# Patient Record
Sex: FEMALE | Race: WHITE | NOT HISPANIC OR LATINO | Employment: FULL TIME | URBAN - METROPOLITAN AREA
[De-identification: names, ages, dates, MRNs, and addresses within clinical notes are randomized per-mention and may not be internally consistent; named-entity substitution may affect disease eponyms.]

---

## 2021-09-14 ENCOUNTER — OFFICE VISIT (OUTPATIENT)
Dept: URGENT CARE | Facility: PHYSICIAN GROUP | Age: 31
End: 2021-09-14
Payer: COMMERCIAL

## 2021-09-14 VITALS
HEIGHT: 65 IN | DIASTOLIC BLOOD PRESSURE: 58 MMHG | OXYGEN SATURATION: 99 % | BODY MASS INDEX: 29.16 KG/M2 | RESPIRATION RATE: 16 BRPM | TEMPERATURE: 99.4 F | HEART RATE: 89 BPM | SYSTOLIC BLOOD PRESSURE: 112 MMHG | WEIGHT: 175 LBS

## 2021-09-14 DIAGNOSIS — J02.9 SORE THROAT: ICD-10-CM

## 2021-09-14 LAB
INT CON NEG: NEGATIVE
INT CON POS: POSITIVE
S PYO AG THROAT QL: NEGATIVE

## 2021-09-14 PROCEDURE — 87880 STREP A ASSAY W/OPTIC: CPT | Performed by: PHYSICIAN ASSISTANT

## 2021-09-14 PROCEDURE — 99203 OFFICE O/P NEW LOW 30 MIN: CPT | Performed by: PHYSICIAN ASSISTANT

## 2021-09-14 ASSESSMENT — ENCOUNTER SYMPTOMS
COUGH: 0
FEVER: 0
SORE THROAT: 1
CHILLS: 0

## 2021-09-14 NOTE — PROGRESS NOTES
"  Subjective:   Keturah Rusty is a 30 y.o. female who presents today with   Chief Complaint   Patient presents with   • Pharyngitis     x2 days      Pharyngitis   This is a new problem. Episode onset: 2 days. The problem has been unchanged. There has been no fever. The pain is mild. Pertinent negatives include no congestion or coughing. She has tried nothing for the symptoms. The treatment provided no relief.   I personally donned proper PPE throughout visit today.     PMH:  has no past medical history on file.  MEDS: No current outpatient medications on file.  ALLERGIES:   Allergies   Allergen Reactions   • Doxycycline      SURGHX: History reviewed. No pertinent surgical history.  SOCHX:  reports that she has never smoked. She has never used smokeless tobacco.  FH: Reviewed with patient, not pertinent to this visit.     Review of Systems   Constitutional: Negative for chills and fever.   HENT: Positive for sore throat. Negative for congestion.    Respiratory: Negative for cough.       Objective:   /58   Pulse 89   Temp 37.4 °C (99.4 °F) (Temporal)   Resp 16   Ht 1.651 m (5' 5\")   Wt 79.4 kg (175 lb)   SpO2 99%   BMI 29.12 kg/m²   Physical Exam  Vitals and nursing note reviewed.   Constitutional:       General: She is not in acute distress.     Appearance: Normal appearance. She is well-developed. She is not ill-appearing or toxic-appearing.   HENT:      Head: Normocephalic and atraumatic.      Right Ear: Hearing normal.      Left Ear: Hearing normal.      Mouth/Throat:      Pharynx: Posterior oropharyngeal erythema present. No oropharyngeal exudate.   Eyes:      Pupils: Pupils are equal, round, and reactive to light.   Cardiovascular:      Rate and Rhythm: Normal rate and regular rhythm.      Heart sounds: Normal heart sounds.   Pulmonary:      Effort: Pulmonary effort is normal. No respiratory distress.      Breath sounds: Normal breath sounds. No stridor. No wheezing, rhonchi or rales.   Musculoskeletal: "      Comments: Normal movement in all 4 extremities   Lymphadenopathy:      Cervical: No cervical adenopathy.   Skin:     General: Skin is warm and dry.   Neurological:      Mental Status: She is alert.      Coordination: Coordination normal.   Psychiatric:         Mood and Affect: Mood normal.       STREP A NEG  Assessment/Plan:   Assessment    1. Sore throat  - POCT Rapid Strep A  Symptoms and presentation are most consistent with viral illness versus postnasal drip that are causing her symptoms.  Differential diagnosis, natural history, supportive care, and indications for immediate follow-up discussed.   Patient given instructions and understanding of medications and treatment.    If not improving in 3-5 days, F/U with PCP or return to UC if symptoms worsen.    Patient agreeable to plan.  Greater than 20 minutes were spent reviewing patient's chart, examining and obtaining history from patient, and discussing plan of care.       Please note that this dictation was created using voice recognition software. I have made every reasonable attempt to correct obvious errors, but I expect that there are errors of grammar and possibly content that I did not discover before finalizing the note.    Royal Bell PA-C

## 2021-09-17 ENCOUNTER — OFFICE VISIT (OUTPATIENT)
Dept: URGENT CARE | Facility: PHYSICIAN GROUP | Age: 31
End: 2021-09-17
Payer: COMMERCIAL

## 2021-09-17 VITALS
TEMPERATURE: 98.1 F | DIASTOLIC BLOOD PRESSURE: 70 MMHG | WEIGHT: 183 LBS | RESPIRATION RATE: 16 BRPM | SYSTOLIC BLOOD PRESSURE: 126 MMHG | HEART RATE: 83 BPM | HEIGHT: 65 IN | OXYGEN SATURATION: 98 % | BODY MASS INDEX: 30.49 KG/M2

## 2021-09-17 DIAGNOSIS — J02.0 PHARYNGITIS DUE TO STREPTOCOCCUS SPECIES: ICD-10-CM

## 2021-09-17 LAB
INT CON NEG: NORMAL
INT CON POS: NORMAL
S PYO AG THROAT QL: POSITIVE

## 2021-09-17 PROCEDURE — 99213 OFFICE O/P EST LOW 20 MIN: CPT | Performed by: PHYSICIAN ASSISTANT

## 2021-09-17 PROCEDURE — 87880 STREP A ASSAY W/OPTIC: CPT | Performed by: PHYSICIAN ASSISTANT

## 2021-09-17 RX ORDER — AMOXICILLIN 500 MG/1
500 CAPSULE ORAL 2 TIMES DAILY
Qty: 20 CAPSULE | Refills: 0 | Status: SHIPPED | OUTPATIENT
Start: 2021-09-17 | End: 2021-09-27

## 2021-09-17 ASSESSMENT — ENCOUNTER SYMPTOMS
PALPITATIONS: 0
STRIDOR: 0
VOMITING: 0
SHORTNESS OF BREATH: 0
DIARRHEA: 0
FEVER: 0
COUGH: 0
SORE THROAT: 1
SPUTUM PRODUCTION: 0
HEADACHES: 0
DIAPHORESIS: 0
MYALGIAS: 0
NAUSEA: 0
DIZZINESS: 0
CHILLS: 0
WHEEZING: 0
SINUS PAIN: 0
ABDOMINAL PAIN: 0

## 2021-09-17 NOTE — PROGRESS NOTES
Subjective:   Keturah Ojeda is a 30 y.o. female who presents for Pharyngitis (possible strep , white dots on tonsils,swollen tonsils)      HPI:  This is a very pleasant 30-year-old female presenting to the clinic with sore throat x7 days.  Patient was last seen in clinic 3 days ago for this issue.  At that time she tested negative for strep pharyngitis.  States her symptoms have remained fairly constant.  She has noticed exudates on both tonsils.  Does not believe she has been running a fever.  She does have pain every time she swallows.  Denies any difficulty swallowing or pain with neck movement.  Denies any cough or congestion.  She has been taking OTC antihistamine, Advil and DayQuil daily which provides mild improvement.  She has had no known ill contacts.    Review of Systems   Constitutional: Negative for chills, diaphoresis, fever and malaise/fatigue.   HENT: Positive for sore throat. Negative for congestion, ear pain and sinus pain.    Respiratory: Negative for cough, sputum production, shortness of breath, wheezing and stridor.    Cardiovascular: Negative for chest pain and palpitations.   Gastrointestinal: Negative for abdominal pain, diarrhea, nausea and vomiting.   Musculoskeletal: Negative for myalgias.   Neurological: Negative for dizziness and headaches.   Endo/Heme/Allergies: Negative for environmental allergies.       Medications:    • This patient does not have an active medication from one of the medication groupers.    Allergies: Doxycycline    Problem List: Keturah Ojeda does not have a problem list on file.    Surgical History:  No past surgical history on file.    Past Social Hx: Keturah Ojeda  reports that she has never smoked. She has never used smokeless tobacco. She reports that she does not drink alcohol and does not use drugs.     Past Family Hx:  Keturah Ojeda family history is not on file.     Problem list, medications, and allergies reviewed by myself today in Epic.     Objective:     /70 (BP  "Location: Right arm, Patient Position: Sitting, BP Cuff Size: Adult)   Pulse 83   Temp 36.7 °C (98.1 °F) (Temporal)   Resp 16   Ht 1.651 m (5' 5\")   Wt 83 kg (183 lb)   SpO2 98%   BMI 30.45 kg/m²     Physical Exam  Constitutional:       General: She is not in acute distress.     Appearance: Normal appearance. She is not ill-appearing, toxic-appearing or diaphoretic.   HENT:      Head: Normocephalic and atraumatic.      Right Ear: Tympanic membrane, ear canal and external ear normal.      Left Ear: Tympanic membrane, ear canal and external ear normal.      Nose: Nose normal. No congestion or rhinorrhea.      Mouth/Throat:      Mouth: Mucous membranes are moist.      Pharynx: Oropharyngeal exudate and posterior oropharyngeal erythema present.      Comments: 2+ tonsillar edema bilaterally.  Exudates present predominantly on the right tonsil.  Uvula midline nondeviated.  Eyes:      Conjunctiva/sclera: Conjunctivae normal.   Neck:      Comments: Bilateral anterior cervical adenopathy.  Cardiovascular:      Rate and Rhythm: Normal rate and regular rhythm.      Pulses: Normal pulses.      Heart sounds: Normal heart sounds.   Pulmonary:      Effort: Pulmonary effort is normal.      Breath sounds: Normal breath sounds. No wheezing.   Musculoskeletal:      Cervical back: Normal range of motion. No muscular tenderness.   Lymphadenopathy:      Cervical: Cervical adenopathy present.   Skin:     General: Skin is warm and dry.      Capillary Refill: Capillary refill takes less than 2 seconds.   Neurological:      Mental Status: She is alert.   Psychiatric:         Mood and Affect: Mood normal.         Thought Content: Thought content normal.       POCT strep: Positive      Assessment/Plan:     Comments/MDM:     Take antibiotic as directed.  Oral Hydration.  Warm salt water gargles.  OTC Throat lozenges or spray (Cepacol).  Tylenol and Motrin as directed for pain and fever.  Hand Hygiene: Wash hands frequently with soap and " water.  Throw away toothbrush after 24 hrs on antibiotics, replace with new one.    • Follow up for persistent throat pain, increased swelling, persistent fevers, difficulty swallowing, shortness of breath, weakness, elevated heart rate, or any other concerns.      Diagnosis and associated orders:     1. Pharyngitis due to Streptococcus species    - POCT Rapid Strep A  - amoxicillin (AMOXIL) 500 MG Cap; Take 1 Capsule by mouth 2 times a day for 10 days.  Dispense: 20 Capsule; Refill: 0           Differential diagnosis, natural history, supportive care, and indications for immediate follow-up discussed.    Advised the patient to follow-up with the primary care physician for recheck, reevaluation, and consideration of further management.    Please note that this dictation was created using voice recognition software. I have made reasonable attempt to correct obvious errors, but I expect that there are errors of grammar and possibly content that I did not discover before finalizing the note.    This note was electronically signed by DIAAN Bruno PA-C

## 2023-07-15 ENCOUNTER — OFFICE VISIT (OUTPATIENT)
Dept: URGENT CARE | Facility: PHYSICIAN GROUP | Age: 33
End: 2023-07-15
Payer: COMMERCIAL

## 2023-07-15 VITALS
TEMPERATURE: 97.6 F | WEIGHT: 200 LBS | HEART RATE: 84 BPM | RESPIRATION RATE: 16 BRPM | OXYGEN SATURATION: 98 % | SYSTOLIC BLOOD PRESSURE: 102 MMHG | DIASTOLIC BLOOD PRESSURE: 70 MMHG | HEIGHT: 65 IN | BODY MASS INDEX: 33.32 KG/M2

## 2023-07-15 DIAGNOSIS — B35.4 TINEA CORPORIS: ICD-10-CM

## 2023-07-15 PROCEDURE — 3074F SYST BP LT 130 MM HG: CPT | Performed by: PHYSICIAN ASSISTANT

## 2023-07-15 PROCEDURE — 99213 OFFICE O/P EST LOW 20 MIN: CPT | Performed by: PHYSICIAN ASSISTANT

## 2023-07-15 PROCEDURE — 3078F DIAST BP <80 MM HG: CPT | Performed by: PHYSICIAN ASSISTANT

## 2023-07-15 RX ORDER — CLOTRIMAZOLE 1 %
CREAM (GRAM) TOPICAL
Qty: 24 G | Refills: 0 | Status: SHIPPED | OUTPATIENT
Start: 2023-07-15

## 2023-07-15 NOTE — PROGRESS NOTES
"Subjective:   Keturah Ojeda is a 32 y.o. female who presents for Rash (Small, Rt forearm x2wk)      HPI  The patient presents to the Urgent Care with complaints of a small rash to the right forearm for about a week.  Not itchy.  Dry, red.  Thought it was ringworm but unsure.  Has tried antibiotic ointment without relief.  She has not tried antifungal cream.  Has checked her dog for any ringworm but did not see any.  No other symptoms.  No other rash.        History reviewed. No pertinent past medical history.  Allergies   Allergen Reactions    Doxycycline         Objective:     /70   Pulse 84   Temp 36.4 °C (97.6 °F) (Temporal)   Resp 16   Ht 1.651 m (5' 5\")   Wt 90.7 kg (200 lb)   LMP 06/14/2023 (Approximate)   SpO2 98%   BMI 33.28 kg/m²     Physical Exam  Vitals reviewed.   Constitutional:       General: She is not in acute distress.     Appearance: Normal appearance. She is not ill-appearing or toxic-appearing.   Eyes:      Conjunctiva/sclera: Conjunctivae normal.   Cardiovascular:      Rate and Rhythm: Normal rate.   Pulmonary:      Effort: Pulmonary effort is normal.   Musculoskeletal:      Cervical back: Neck supple.   Skin:     General: Skin is warm and dry.      Findings: Rash present.      Comments: Small round scaly erythematous rash approximately 1 cm in diameter to right dorsal forearm. Some central clearing. Blanchable. No induration. Nontender. No abscess.    Neurological:      General: No focal deficit present.      Mental Status: She is alert and oriented to person, place, and time.   Psychiatric:         Mood and Affect: Mood normal.         Behavior: Behavior normal.         Diagnosis and associated orders:     1. Tinea corporis  - clotrimazole (LOTRIMIN) 1 % Cream; Apply to affected area twice per day until resolution for 1-3 weeks.  Dispense: 24 g; Refill: 0       Comments/MDM:     Patient's presenting symptoms and exam findings are consistent with ring worm.  Treatment plan is " clotrimazole cream twice daily. Important to not skip doses.           I personally reviewed prior external notes and test results pertinent to today's visit. Pathogenesis of diagnosis discussed including typical length and natural progression. Supportive care, natural history, differential diagnoses, and indications for immediate follow-up discussed. Patient expresses understanding and agrees to plan. Patient denies any other questions or concerns.     Follow-up with the primary care physician for recheck, reevaluation, and consideration of further management.    Please note that this dictation was created using voice recognition software. I have made a reasonable attempt to correct obvious errors, but I expect that there are errors of grammar and possibly content that I did not discover before finalizing the note.    This note was electronically signed by Kike Reed PA-C